# Patient Record
Sex: FEMALE | Race: WHITE | NOT HISPANIC OR LATINO | Employment: FULL TIME | URBAN - METROPOLITAN AREA
[De-identification: names, ages, dates, MRNs, and addresses within clinical notes are randomized per-mention and may not be internally consistent; named-entity substitution may affect disease eponyms.]

---

## 2020-10-16 ENCOUNTER — OFFICE VISIT (OUTPATIENT)
Dept: FAMILY MEDICINE | Facility: CLINIC | Age: 40
End: 2020-10-16
Payer: COMMERCIAL

## 2020-10-16 VITALS
OXYGEN SATURATION: 100 % | BODY MASS INDEX: 22.76 KG/M2 | HEIGHT: 67 IN | HEART RATE: 100 BPM | RESPIRATION RATE: 16 BRPM | DIASTOLIC BLOOD PRESSURE: 72 MMHG | TEMPERATURE: 98.4 F | SYSTOLIC BLOOD PRESSURE: 110 MMHG | WEIGHT: 145 LBS

## 2020-10-16 DIAGNOSIS — M79.602 LEFT ARM PAIN: ICD-10-CM

## 2020-10-16 DIAGNOSIS — M62.830 BACK MUSCLE SPASM: Primary | ICD-10-CM

## 2020-10-16 DIAGNOSIS — M25.512 TRIGGER POINT OF SHOULDER REGION, LEFT: ICD-10-CM

## 2020-10-16 PROCEDURE — 99203 OFFICE O/P NEW LOW 30 MIN: CPT

## 2020-10-16 RX ORDER — NAPROXEN 500 MG/1
500 TABLET ORAL 2 TIMES DAILY WITH MEALS
Qty: 14 TABLET | Refills: 0 | Status: SHIPPED | OUTPATIENT
Start: 2020-10-16 | End: 2020-10-23

## 2020-10-16 RX ORDER — CYCLOBENZAPRINE HCL 5 MG
5-10 TABLET ORAL 3 TIMES DAILY PRN
Qty: 24 TABLET | Refills: 0 | Status: SHIPPED | OUTPATIENT
Start: 2020-10-16

## 2020-10-16 ASSESSMENT — MIFFLIN-ST. JEOR: SCORE: 1370.35

## 2020-10-16 NOTE — PATIENT INSTRUCTIONS
Schedule massage    Patient Education     Muscle Spasm  A muscle spasm is a sudden tightening of the muscle you can t control. This may be caused by strain, overworking the muscle, or injury. It can also be caused by dehydration, electrolyte imbalance, diabetes, alcohol use, and certain medicines. If it goes on long enough the muscle spasm causes pain. Common areas for muscle spasm are the legs, neck, and back.  Home care    Heat, massage, and stretching will help relax muscle spasm.    When the spasm is in your arm or leg, stretch the muscle passively. To do this, have someone bend or straighten the joint above or below the muscle until you feel the stretch on the sore muscle. You can stretch the muscle actively by moving the affected body part. This will stretch the muscle that is in spasm. For example, if the spasm is in your calf, bend the ankle so your toes point upward toward your knee. This will stretch your calf muscle.    You may use over-the-counter pain medicine to control pain, unless another medicine was prescribed. If you have chronic liver or kidney disease or ever had a stomach ulcer or gastrointestinal bleeding, talk with your healthcare provider before using these medicines.    Follow-up care  Follow up with your healthcare provider, or as advised.    When to seek medical advice  Call your healthcare provider right away if any of the following occur:    Fingers or toes become swollen, cold, blue, numb, or tingly    You develop weakness in the affected arm or leg    Pain increases and is not controlled by the above measures  Date Last Reviewed: 5/1/2018 2000-2019 [employer]. [address] [place] [address], [place] [address]. All rights reserved. This information is not intended as a substitute for professional medical care. Always follow your healthcare professional's instructions.

## 2020-10-16 NOTE — PROGRESS NOTES
"SUBJECTIVE:   Kortney Mccormack is a 40 year old female presenting with a chief complaint of   Chief Complaint   Patient presents with     Shoulder Pain     MS Injury/Pain    Onset of symptoms was 3 day(s) ago.  Location: left arm  Context: no known injury  Course of symptoms is same.    Severity severe rated 8/10, sometimes brings to tears, pain is constant but changes locations from posterior left shoulder/upper left back to upper arm to lower arm  Current and Associated symptoms: Pain  Denies  Swelling, Bruising, Warmth, Redness and Decreased range of motion  Aggravating Factors: none  Therapies to improve symptoms include: ibuprofen \"recommended dosing on bottle as often as I can\" which doesn't seem to help, heat hasn't helped   This is not the first time this type of problem has occurred for this patient, but pain has previously resolved on its own.   Denies fever, chest pain, shortness of breath, nausea, vomiting, weakness, dizziness, numbness, tingling, headache, vision change. She states she thinks she may have a pinched nerve in her neck causing symptoms. Pain wakes her at night and she has been unable to sleep because of it.     Problem list, Medication list, Allergies, and Medical history reviewed in EPIC.    ROS:  Review of systems negative except for noted above    OBJECTIVE  /72   Pulse 100   Temp 98.4  F (36.9  C) (Oral)   Resp 16   Ht 1.702 m (5' 7\")   Wt 65.8 kg (145 lb)   LMP  (LMP Unknown)   SpO2 100%   Breastfeeding No   BMI 22.71 kg/m      Physical Exam  Constitutional:       General: She is not in acute distress.     Appearance: She is not toxic-appearing.   HENT:      Head: Normocephalic and atraumatic.   Eyes:      Extraocular Movements: Extraocular movements intact.   Neck:      Musculoskeletal: Normal range of motion. No neck rigidity or muscular tenderness.      Comments: Cervical flexion worsens pain in left arm  Cardiovascular:      Rate and Rhythm: Normal rate and regular " rhythm.      Pulses: Normal pulses.   Pulmonary:      Effort: Pulmonary effort is normal. No respiratory distress.      Breath sounds: Normal breath sounds. No wheezing, rhonchi or rales.   Musculoskeletal:      Right shoulder: Normal. She exhibits normal range of motion, no bony tenderness, no swelling, normal pulse and normal strength.      Left shoulder: Normal. She exhibits normal range of motion, no bony tenderness, no swelling and normal pulse.      Left elbow: Normal.      Left forearm: Normal.      Left hand: Normal.      Comments: No spinal tenderness to palpation. Left arm nontender to palpation. Trigger point muscle tension palpated left upper back worsens pain in left arm   Lymphadenopathy:      Cervical: No cervical adenopathy.   Skin:     General: Skin is warm and dry.      Capillary Refill: Capillary refill takes less than 2 seconds.      Findings: No bruising or erythema.   Neurological:      General: No focal deficit present.      Mental Status: She is oriented to person, place, and time.      Cranial Nerves: Cranial nerves are intact. No cranial nerve deficit.      Sensory: No sensory deficit.      Motor: Motor function is intact. No weakness.      Coordination: Coordination normal.       ASSESSMENT:      ICD-10-CM    1. Back muscle spasm  M62.830 naproxen (NAPROSYN) 500 MG tablet     cyclobenzaprine (FLEXERIL) 5 MG tablet   2. Trigger point of shoulder region, left  M25.512    3. Left arm pain  M79.602 naproxen (NAPROSYN) 500 MG tablet     cyclobenzaprine (FLEXERIL) 5 MG tablet      PLAN:    With trigger point tenderness discussed most likely cause of pain is musculoskeletal tension, possibly pinched nerve in spine. Recommended rest from repetitive activities, massage, prescription sent to pharmacy for Naproxen 500 mg twice daily for 7 days with food and cyclobenzaprine 5-10 mg every 8 hours as needed for muscle spasm mostly to take before bed. Do not drive or operate machinery while taking muscle  relaxant. Do not take any other NSAIDs including ibuprofen or aleve while taking Naproxen. Continue applying heat and performing gentle neck ROM stretches.  X-ray not indicated at this time without bony tenderness.     Follow-up with PCP if symptoms persist for 7 days, and sooner if symptoms worsen or new symptoms develop.     Discussed red flag symptoms which warrant immediate visit in emergency room    All questions were answered and patient verbalized understanding. AVS reviewed with patient.     Veronica Brooks, KB, APRN, CNP 10/16/2020 2:23 PM

## 2020-10-30 ENCOUNTER — APPOINTMENT (RX ONLY)
Dept: URBAN - METROPOLITAN AREA CLINIC 55 | Facility: CLINIC | Age: 40
Setting detail: DERMATOLOGY
End: 2020-10-30

## 2020-10-30 DIAGNOSIS — Z41.9 ENCOUNTER FOR PROCEDURE FOR PURPOSES OTHER THAN REMEDYING HEALTH STATE, UNSPECIFIED: ICD-10-CM

## 2020-10-30 PROCEDURE — ? DIAGNOSIS COMMENT

## 2020-10-30 PROCEDURE — ? BOTOX

## 2020-10-30 NOTE — PROCEDURE: BOTOX
Show Nasal Units: Yes
Dilution (U/0.1 Cc): 4
Additional Area 4 Units: 0
Show Lcl Units: No
Periorbital Skin Units: 12
Detail Level: Detailed
Nasal Root Units: 8
Post-Care Instructions: Patient instructed to not lie down for 4 hours and limit physical activity for 24 hours.
Expiration Date (Month Year): 8/2023
Additional Area 1 Location: Lateral Brow
Glabellar Complex Units: 24
Consent: Written consent obtained. Risks include but not limited to lid/brow ptosis, bruising, swelling, diplopia, temporary effect, incomplete chemical denervation.
Lot #: [id number]

## 2020-10-30 NOTE — PROCEDURE: DIAGNOSIS COMMENT
Comment: Patient reports that Dysport seems to have possibly decreased in efficacy. Will trial Botox in an attempt to see if switching Neurotoxins changes her response.
Detail Level: Simple

## 2023-01-10 ENCOUNTER — OFFICE VISIT (OUTPATIENT)
Dept: FAMILY MEDICINE | Facility: CLINIC | Age: 43
End: 2023-01-10
Payer: COMMERCIAL

## 2023-01-10 VITALS
DIASTOLIC BLOOD PRESSURE: 80 MMHG | HEART RATE: 104 BPM | TEMPERATURE: 98.7 F | WEIGHT: 147.56 LBS | OXYGEN SATURATION: 100 % | SYSTOLIC BLOOD PRESSURE: 133 MMHG | RESPIRATION RATE: 12 BRPM | BODY MASS INDEX: 23.11 KG/M2

## 2023-01-10 DIAGNOSIS — H00.015 HORDEOLUM EXTERNUM LEFT LOWER EYELID: Primary | ICD-10-CM

## 2023-01-10 PROCEDURE — 99213 OFFICE O/P EST LOW 20 MIN: CPT

## 2023-01-10 RX ORDER — DROSPIRENONE AND ETHINYL ESTRADIOL 0.02-3(28)
KIT ORAL
COMMUNITY
Start: 2023-01-02

## 2023-01-10 RX ORDER — POLYMYXIN B SULFATE AND TRIMETHOPRIM 1; 10000 MG/ML; [USP'U]/ML
1-2 SOLUTION OPHTHALMIC EVERY 6 HOURS
Qty: 3 ML | Refills: 0 | Status: SHIPPED | OUTPATIENT
Start: 2023-01-10 | End: 2023-01-17

## 2023-01-10 ASSESSMENT — ENCOUNTER SYMPTOMS
PHOTOPHOBIA: 0
EYE ITCHING: 1
NEUROLOGICAL NEGATIVE: 1
EYE REDNESS: 0
CONSTITUTIONAL NEGATIVE: 1

## 2023-01-10 NOTE — PROGRESS NOTES
Assessment & Plan       ICD-10-CM    1. Hordeolum externum left lower eyelid  H00.015 trimethoprim-polymyxin b (POLYTRIM) 37959-0.1 UNIT/ML-% ophthalmic solution           Patient instructions: To use eye drops as directed, apply warm compress 4 times per day and monitor for new or worsening symptoms.     Medical decision making: Appears to be a hordeolum, but due to its close proximity to the tear duct, will give antibiotic drops to ensure no spreading or infection.     Return if symptoms worsen or fail to improve, for Follow up.    At the end of the encounter, I discussed results, diagnosis, medications. Discussed red flags for immediate return to clinic/ER, as well as indications for follow up if no improvement. Patient understood and agreed to plan. Patient was stable for discharge.    Sharon Sheets is a 42 year old female who presents to clinic today for the following health issues:  Chief Complaint   Patient presents with     Urgent Care     Eye Problem     Redness left eye, mild swollen, tender x1day, bump under left eye, no discharge, no crusting     Kortney presents with reports of redness of her left eyelid and some tenderness that developed yesterday. She denies discharge from the area. She states she has had styes in the past but is concerned as this has no white bump like previous styes. She denies vision changes, contact lens use, injury.           Review of Systems   Constitutional: Negative.    HENT: Negative.    Eyes: Positive for itching. Negative for photophobia, redness and visual disturbance.   Skin: Negative.    Neurological: Negative.        Problem List:  There are no relevant problems documented for this patient.      No past medical history on file.    Social History     Tobacco Use     Smoking status: Former     Types: Cigarettes     Quit date: 10/1/2021     Years since quittin.2     Smokeless tobacco: Never   Substance Use Topics     Alcohol use: Not on file            Objective    /80   Pulse 104   Temp 98.7  F (37.1  C) (Oral)   Resp 12   Wt 66.9 kg (147 lb 9 oz)   LMP 12/26/2022 (Exact Date)   SpO2 100%   BMI 23.11 kg/m    Physical Exam  Constitutional:       Appearance: Normal appearance.   HENT:      Head: Normocephalic and atraumatic.   Eyes:      General:         Left eye: Hordeolum present.     Extraocular Movements: Extraocular movements intact.      Conjunctiva/sclera: Conjunctivae normal.     Musculoskeletal:      Cervical back: Normal range of motion and neck supple.   Skin:     General: Skin is warm and dry.   Neurological:      General: No focal deficit present.      Mental Status: She is alert and oriented to person, place, and time.              Silverio Davis PA-C

## 2024-04-24 ENCOUNTER — LAB REQUISITION (OUTPATIENT)
Dept: LAB | Facility: CLINIC | Age: 44
End: 2024-04-24

## 2024-04-24 DIAGNOSIS — N92.0 EXCESSIVE AND FREQUENT MENSTRUATION WITH REGULAR CYCLE: ICD-10-CM

## 2024-04-24 PROCEDURE — 88305 TISSUE EXAM BY PATHOLOGIST: CPT | Performed by: PATHOLOGY

## 2024-04-26 LAB
PATH REPORT.COMMENTS IMP SPEC: NORMAL
PATH REPORT.COMMENTS IMP SPEC: NORMAL
PATH REPORT.FINAL DX SPEC: NORMAL
PATH REPORT.GROSS SPEC: NORMAL
PATH REPORT.MICROSCOPIC SPEC OTHER STN: NORMAL
PATH REPORT.RELEVANT HX SPEC: NORMAL
PHOTO IMAGE: NORMAL